# Patient Record
Sex: FEMALE | Race: BLACK OR AFRICAN AMERICAN | NOT HISPANIC OR LATINO | Employment: FULL TIME | RURAL
[De-identification: names, ages, dates, MRNs, and addresses within clinical notes are randomized per-mention and may not be internally consistent; named-entity substitution may affect disease eponyms.]

---

## 2022-10-06 ENCOUNTER — OFFICE VISIT (OUTPATIENT)
Dept: PRIMARY CARE CLINIC | Facility: CLINIC | Age: 27
End: 2022-10-06

## 2022-10-06 VITALS
HEART RATE: 77 BPM | DIASTOLIC BLOOD PRESSURE: 67 MMHG | TEMPERATURE: 99 F | RESPIRATION RATE: 18 BRPM | HEIGHT: 64 IN | SYSTOLIC BLOOD PRESSURE: 101 MMHG | BODY MASS INDEX: 23.13 KG/M2 | OXYGEN SATURATION: 99 % | WEIGHT: 135.5 LBS

## 2022-10-06 DIAGNOSIS — R11.2 NAUSEA AND VOMITING, UNSPECIFIED VOMITING TYPE: Primary | ICD-10-CM

## 2022-10-06 PROCEDURE — 99202 OFFICE O/P NEW SF 15 MIN: CPT | Mod: ,,, | Performed by: STUDENT IN AN ORGANIZED HEALTH CARE EDUCATION/TRAINING PROGRAM

## 2022-10-06 PROCEDURE — 99202 PR OFFICE/OUTPT VISIT, NEW, LEVL II, 15-29 MIN: ICD-10-PCS | Mod: ,,, | Performed by: STUDENT IN AN ORGANIZED HEALTH CARE EDUCATION/TRAINING PROGRAM

## 2022-10-06 NOTE — PROGRESS NOTES
Subjective:      Dilia Sidhu is a 27 y.o.female who presents to clinic for Dizziness and Vomiting (Pt states that she threw up after eating [employer] at work. Pt states that [employer] will not let her return to work without a release. )    Patient presents to clinic requesting a clearance to return to work. She states that around 8AM this morning, she ate a sausage biscuit from [employer]. Approximately 15-20 minutes later, she began feeling dizzy and nauseated. She threw up x1. Emesis was NBNB. She felt better immediately after vomiting. Has had no other symptoms including diarrhea, nausea, or vomiting since then. She had no symptoms prior to eating breakfast.     ROS as above    Past Medical History:  has no past medical history on file.   Past Surgical History:  has no past surgical history on file.  Family History: family history is not on file.  Social History:    Allergies: Review of patient's allergies indicates:  No Known Allergies    Objective:     Vitals:    10/06/22 1423   BP: 101/67   Pulse: 77   Resp: 18   Temp: 98.6 °F (37 °C)     Physical Exam  Vitals reviewed.   Constitutional:       General: She is not in acute distress.     Appearance: Normal appearance. She is normal weight. She is not ill-appearing, toxic-appearing or diaphoretic.   HENT:      Head: Normocephalic and atraumatic.      Right Ear: External ear normal.      Left Ear: External ear normal.   Eyes:      General: No scleral icterus.        Right eye: No discharge.         Left eye: No discharge.      Conjunctiva/sclera: Conjunctivae normal.   Cardiovascular:      Rate and Rhythm: Normal rate and regular rhythm.      Pulses: Normal pulses.   Pulmonary:      Effort: Pulmonary effort is normal. No respiratory distress.      Breath sounds: Normal breath sounds. No wheezing.   Neurological:      General: No focal deficit present.      Mental Status: She is alert.   Psychiatric:         Behavior: Behavior normal.           Assessment/Plan:     1. Nausea and vomiting, unspecified vomiting type  - resolved  - clearance form for work filled out      Return to clinic as needed.     Kian Gottlieb MD  Family Medicine  10/06/2022

## 2022-10-06 NOTE — LETTER
October 6, 2022      [facility]  [address] [facility] [place] [address]  Phone: [phone]  Fax: [phone]       Patient: Dilia Sidhu   YOB: 1995  Date of Visit: 10/06/2022    To Whom It May Concern:    Elena Sidhu  was at [facility] on 10/06/2022. The patient may return to work/school on 10/7/22 with no restrictions. If you have any questions or concerns, or if I can be of further assistance, please do not hesitate to contact me.    Sincerely,    Kian Gottlieb MD      Called and spoke to patient, she states that she is still having elevated glucose readings. She states that she is having readings in the 350's in the PM and 300's in the AM. She states that she may occasionally get down to 270 during the day. She states that she is increasing the units as directed and is now up to 40 units daily. She states that she is having frequent urination (6-7 times at night). She states that she is still eating sweets after dinner but has reduced how much. She states that she is really watching her carbs and protein intake to help with sugars at this time. She states that she doesn't know what to do next. She states that she did fail with the ozempic with glucose control and now is thinking that she is doing the same thing with the Soliqua. She states that she is profusely sweating but that normally happens when her sugars are elevated. She states that she does remember [facility] talking to her in office about a pill that can help her urinate out her sugars. She is interested in that if it would help bring her glucose down. If new medication is called in please send to [facility] not [facility].

## 2023-06-20 ENCOUNTER — PATIENT MESSAGE (OUTPATIENT)
Dept: RESEARCH | Facility: HOSPITAL | Age: 28
End: 2023-06-20

## 2023-07-21 ENCOUNTER — PATIENT MESSAGE (OUTPATIENT)
Dept: ADMINISTRATIVE | Facility: HOSPITAL | Age: 28
End: 2023-07-21